# Patient Record
Sex: FEMALE | Race: WHITE | URBAN - METROPOLITAN AREA
[De-identification: names, ages, dates, MRNs, and addresses within clinical notes are randomized per-mention and may not be internally consistent; named-entity substitution may affect disease eponyms.]

---

## 2018-05-10 ENCOUNTER — PRE VISIT (OUTPATIENT)
Dept: ONCOLOGY | Facility: CLINIC | Age: 68
End: 2018-05-10

## 2018-05-10 NOTE — TELEPHONE ENCOUNTER
5/11/18  lung cancer 2nd opinion  pt called  self ref  [facility] records CE, imaging pushed, slides TBM

## 2018-05-11 ENCOUNTER — CARE COORDINATION (OUTPATIENT)
Dept: ONCOLOGY | Facility: CLINIC | Age: 68
End: 2018-05-11

## 2018-05-11 ENCOUNTER — ONCOLOGY VISIT (OUTPATIENT)
Dept: ONCOLOGY | Facility: CLINIC | Age: 68
End: 2018-05-11
Attending: INTERNAL MEDICINE
Payer: COMMERCIAL

## 2018-05-11 VITALS
WEIGHT: 169.1 LBS | TEMPERATURE: 97.4 F | HEART RATE: 72 BPM | SYSTOLIC BLOOD PRESSURE: 153 MMHG | DIASTOLIC BLOOD PRESSURE: 91 MMHG | RESPIRATION RATE: 16 BRPM | HEIGHT: 64 IN | OXYGEN SATURATION: 97 % | BODY MASS INDEX: 28.87 KG/M2

## 2018-05-11 DIAGNOSIS — C88.40 MALT LYMPHOMA: Primary | ICD-10-CM

## 2018-05-11 PROCEDURE — 99205 OFFICE O/P NEW HI 60 MIN: CPT | Mod: ZP | Performed by: INTERNAL MEDICINE

## 2018-05-11 PROCEDURE — G0463 HOSPITAL OUTPT CLINIC VISIT: HCPCS | Mod: ZF

## 2018-05-11 RX ORDER — POTASSIUM CHLORIDE 1500 MG/1
TABLET, EXTENDED RELEASE ORAL
COMMUNITY
Start: 2017-10-06

## 2018-05-11 RX ORDER — LEVOTHYROXINE SODIUM 75 UG/1
TABLET ORAL
COMMUNITY
Start: 2017-12-02

## 2018-05-11 RX ORDER — TRIAMTERENE/HYDROCHLOROTHIAZID 37.5-25 MG
TABLET ORAL
COMMUNITY
Start: 2017-12-04

## 2018-05-11 RX ORDER — NICOTINE POLACRILEX 4 MG/1
20 GUM, CHEWING ORAL
COMMUNITY
Start: 2018-02-07

## 2018-05-11 ASSESSMENT — PAIN SCALES - GENERAL: PAINLEVEL: NO PAIN (0)

## 2018-05-11 NOTE — PROGRESS NOTES
"RN met with pt, Jewell, and Ar, \"mate\"  today.  Introduced self and the care coordinator role as well as how to contact triage vs how to contact RN care coordinator.  Had pt complete authorization to discuss PHI form.  Completed learning assessment with patient.        "

## 2018-05-11 NOTE — LETTER
5/11/2018       RE: Jewell Hutton  [address] [id number]     Dear Colleague,    Thank you for referring your patient, Jewell Hutton, to the [facility]. Please see a copy of my visit note below.    DATE OF VISIT: May 11, 2018    REASON FOR REFERRAL:  MALT lymphoma Lung    HISTORY OF PRESENT ILLNESS:     67-year-old female with past medical history significant for hypothyroidism, hypertension who in May of last year developed symptoms of productive cough. She was treated with courses of antibiotics with improvement in symptoms. Serial chest x-ray did not show the resolution of the right-sided lung mass. She had a CT chest done in June 2017 showed a right infiltrating adenopathy in the right lower nodule measuring 1.8 cm. Patient was referred to pulmonary for further evaluation repeat CT chest was unchanged. She underwent bronchoscopy with brushings and fine-needle aspirate of the lymph nodes which showed granulomatous inflammation. Patient then had a PET scan done on 08/29/17 showed significant right hilar avidity of 14.8. She subsequently underwent EUS on 09/01/17 with biopsy of the hilar lymph nodes but showed granulomatous inflammation consistent with sarcoidosis. Repeat CT scan in January 2018 was unchanged.   01/23/18 underwent repeat bronchoscopy with biopsies which were reviewed at [facility] and results were consistent with MALT lymphoma.     02/2018 seen by medical oncology in [place]. She was treated with weekly Decadron once a week for 6 weeks followed by a repeat scan in April 2018 which was stable. Plan is to continue observation with repeat scans    Presents to the clinic today for second opinion. Clinically feels well with no active complaints. Denies dyspnea, chest pain, productive cough, fever/chills, night sweats, weight loss, generalized fatigue or any other complaints. Good appetite and energy levels      REVIEW OF SYSTEMS:      ROS: 14 point ROS neg other  than the symptoms noted above in the HPI.    PAST MEDICAL HISTORY:   Hypertension  Hypothyroidism  GERD  GARCIA    PAST SURGICAL HISTORY:   Lung biopsy    ALLERGIES:   Allergies as of 05/11/2018     (Not on File)       MEDICATIONS:   Reviewed    FAMILY HISTORY:   Sister with breast cancer at age 55  Breast cancer at age 70  1 sibling with esophageal cancer    SOCIAL HISTORY:   Social History     Social History     Marital status: Single     Spouse name: N/A     Number of children: N/A     Years of education: N/A     Social History Main Topics     Smoking status: Not on file     Smokeless tobacco: Not on file     Alcohol use Not on file     Drug use: Not on file     Sexual activity: Not on file     Other Topics Concern     Not on file     Social History Narrative       PHYSICAL EXAMINATION:   There were no vitals taken for this visit.  Wt Readings from Last 10 Encounters:   No data found for Wt      ECOG performance status: 0  Exam:  Constitutional: healthy, alert and no distress  Head: Normocephalic. No masses, lesions, tenderness or abnormalities  Neck: Neck supple. No adenopathy.  ENT: ENT exam normal, no neck nodes or sinus tenderness  Cardiovascular: RRR. No murmurs, clicks gallops or rub  Respiratory:  Lungs clear  Gastrointestinal: Abdomen soft, non-tender. BS normal. No masses, organomegaly  Musculoskeletal: extremities normal  Skin: no suspicious lesions or rashes  Neurologic: Gait normal. Sensation grossly WNL.  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  See comment.      MCSS:  I      Comments This case was forwarded to [facility], Department of  "Pathology, [place], [place] for expert pulmonary pathology consultation (case #CA-). The above diagnosis reflects their expert consultative opinion. In addition, the patient's prior right subcarinal needle aspirate ([id number]) was reviewed in conjunction with this case. Their rendered diagnosis on that case was \"Mixture of blood clot, abundant small mature-appearing lymphocytes, and scattered small clusters of epithelioid histiocytes, consistent with small noncaseating granulomas.\" Diagnosis reported to Dr. Anant Paige.      Gross Description Received is one container, specimen in formalin,  labeled with proper patient identification.    Designated \"RB1 and RB6 transbronchial biopsy\" are 7 pale tan friable tissue fragments, less than 0.1-0.6 cm, entirely submitted in A1.  [facility]     Microscopic The H+E stained sections of the right upper lobe and right lower lobe transbronchial biopsies reveal fragments of lung tissue with a relatively dense and nodular infiltrate of small, cytologically mature lymphocytes and scattered plasma cells.  In addition, focal small histiocytic clusters consistent with small noncaseating granulomas are noted.  The case was forwarded to [facility], [place], [place] for pulmonary pathology consultation.  Dr. Real Hart reports that immunostains for CD3 highlight scattered small background lymphocytes, however, most of the lymphoid infiltrate exhibits positivity for CD20.  CD20 positive cells are also reported to demonstrate coexpression of CD43 and BCL-2 without expression of CD5, CD10, BCL-6 or cyclin D1.  CD21 is reported to demonstrate scattered follicular dendritic networks.  Kappa and lambda light chain in situ hybridization is reported to reveal a kappa light chain restriction within the plasma cell population.  The proliferation rate was estimated at 5-10% per Ki-67 staining.  MARÍA ELENA/dmk        09/01/17   Non-Gynecologic Cytology Interpretation Right " subcarinal lymph node, EUS-guided FNA cytology:  Negative for malignant cells.  Lymph node material obtained.  Noncaseating granulomatous inflammation suggestive of sarcoidosis.  See comment.       08/18/17   Non-Gynecologic Cytology Interpretation Subcarinal lymph node, EBUS-guided FNA cytology:  Negative for malignant cells.  Includes some histiocytic clusters, without caseous necrosis or significant acute inflammatory component.  See comment.       IMAGING RESULTS:    CT chest 04/03/18     IMPRESSION:  1.Consolidative changes of the right lung. Findings are similar to the prior study. Stable associated central adenopathy.  2.Stable right lung nodule.  3.No new mass or adenopathy    CT chest 01/12/18       IMPRESSION:   1. Consolidative changes involving the right upper lobe and perihilar and infrahilar region, unchanged.  2. A 1.5 cm subpleural right lower lobe nodule and a 5 mm left lower lobe nodule, unchanged.    CT chest 10/25/17   IMPRESSION: Consolidation involving the right hilar region with peribronchial thickening and air bronchograms extending to the right upper lung and along the medial aspect of the right middle lobe and the right lower lobe. This has not changed from   08/17/2017. Recommend tissue sampling. On the PET scan, there were nodular areas of increased uptake noted along the periphery.    PET scan 08/25/17     IMPRESSION:  1. FDG avid perihilar consolidation with some areas appearing more solid. Findings concerning for central neoplastic process. Soft tissue sampling is warranted to evaluate.  2. Low-level in 1.8 cm right lower lobe pleural-based nodule with some calcifications. This probably represents a granulomatous process or hamartoma but with the low activity, it should be followed closely.   3. Hepatic steatosis.    CT chest 06/2017     FINDINGS: There is no mediastinal or hilar lymphadenopathy. There is alveolar infiltrate in a portion of the right upper lobe in the right suprahilar  region, with no definite obstructing mass.    There is a 1.8 cm nodular density in the right costophrenic angle in the right lower lobe inferiorly and laterally, indeterminate. Though this could represent focal atelectasis, this is indeterminate. In the left lower lobe posteriorly (series 2 image   45) there is a 7 mm uncalcified nodule, indeterminate. Followup of the right upper lobe infiltrate with plain films in 6 weeks recommended and followup of the two pulmonary nodules in 3 months recommended to assess stability.    There is diffuse fatty infiltration of the liver. 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   We will have the slides reviewed by our pathology Department to  confirm diagnosis.  We had a detailed discussion about the prognosis associated with low-grade indolent MALT lymphoma and potential treatment options. Given stable imaging findings and absence of symptoms, very reasonable to continue with observation at this point with consideration for radiation or Rituxan if there is evidence of recurrence in future.    Patient understands and agrees with above discussion with no further questions at this point. She will continue to follow up with local pulmonologist and medical oncologist with surveillance scans as planned.     RTCas needed.     The patient is ready to learn, no apparent learning barriers were identified, Diagnosis and treatment plans were explained to the patient. The patient expressed understanding of the content. The patient questions were answered to her satisfaction.    Chart documentation with Dragon Voice recognition Software. Although reviewed after completion, some  words and grammatical errors may remain.    Miko Saab MD  Attending Physician   Hematology/Medical Oncology

## 2018-05-11 NOTE — NURSING NOTE
"Oncology Rooming Note    May 11, 2018 3:35 PM   Jewell Hutton is a 67 year old female who presents for:    Chief Complaint   Patient presents with     Oncology Clinic Visit     Lung Cancer     Initial Vitals: BP (!) 153/91  Pulse 72  Temp 97.4  F (36.3  C) (Oral)  Resp 16  Ht 1.626 m (5' 4\")  Wt 76.7 kg (169 lb 1.6 oz)  SpO2 97%  BMI 29.03 kg/m2 Estimated body mass index is 29.03 kg/(m^2) as calculated from the following:    Height as of this encounter: 1.626 m (5' 4\").    Weight as of this encounter: 76.7 kg (169 lb 1.6 oz). Body surface area is 1.86 meters squared.  No Pain (0) Comment: Data Unavailable   No LMP recorded.  Allergies reviewed: Yes  Medications reviewed: Yes    Medications: Medication refills not needed today.  Pharmacy name entered into EPIC: Data Unavailable    Clinical concerns: No New Concerns    5 minutes for nursing intake (face to face time)     BABAR Vizcaino      "

## 2018-05-11 NOTE — PROGRESS NOTES
DATE OF VISIT: May 11, 2018    REASON FOR REFERRAL:  MALT lymphoma Lung    HISTORY OF PRESENT ILLNESS:     67-year-old female with past medical history significant for hypothyroidism, hypertension who in May of last year developed symptoms of productive cough. She was treated with courses of antibiotics with improvement in symptoms. Serial chest x-ray did not show the resolution of the right-sided lung mass. She had a CT chest done in June 2017 showed a right infiltrating adenopathy in the right lower nodule measuring 1.8 cm. Patient was referred to pulmonary for further evaluation repeat CT chest was unchanged. She underwent bronchoscopy with brushings and fine-needle aspirate of the lymph nodes which showed granulomatous inflammation. Patient then had a PET scan done on 08/29/17 showed significant right hilar avidity of 14.8. She subsequently underwent EUS on 09/01/17 with biopsy of the hilar lymph nodes but showed granulomatous inflammation consistent with sarcoidosis. Repeat CT scan in January 2018 was unchanged.   01/23/18 underwent repeat bronchoscopy with biopsies which were reviewed at [facility] and results were consistent with MALT lymphoma.     02/2018 seen by medical oncology in [place]. She was treated with weekly Decadron once a week for 6 weeks followed by a repeat scan in April 2018 which was stable. Plan is to continue observation with repeat scans    Presents to the clinic today for second opinion. Clinically feels well with no active complaints. Denies dyspnea, chest pain, productive cough, fever/chills, night sweats, weight loss, generalized fatigue or any other complaints. Good appetite and energy levels      REVIEW OF SYSTEMS:      ROS: 14 point ROS neg other than the symptoms noted above in the HPI.    PAST MEDICAL HISTORY:   Hypertension  Hypothyroidism  GERD  GARCIA    PAST SURGICAL HISTORY:   Lung biopsy    ALLERGIES:   Allergies as of 05/11/2018     (Not on File)       MEDICATIONS:    Reviewed    FAMILY HISTORY:   Sister with breast cancer at age 55  Breast cancer at age 70  1 sibling with esophageal cancer    SOCIAL HISTORY:   Social History     Social History     Marital status: Single     Spouse name: N/A     Number of children: N/A     Years of education: N/A     Social History Main Topics     Smoking status: Not on file     Smokeless tobacco: Not on file     Alcohol use Not on file     Drug use: Not on file     Sexual activity: Not on file     Other Topics Concern     Not on file     Social History Narrative       PHYSICAL EXAMINATION:   There were no vitals taken for this visit.  Wt Readings from Last 10 Encounters:   No data found for Wt      ECOG performance status: 0  Exam:  Constitutional: healthy, alert and no distress  Head: Normocephalic. No masses, lesions, tenderness or abnormalities  Neck: Neck supple. No adenopathy.  ENT: ENT exam normal, no neck nodes or sinus tenderness  Cardiovascular: RRR. No murmurs, clicks gallops or rub  Respiratory:  Lungs clear  Gastrointestinal: Abdomen soft, non-tender. BS normal. No masses, organomegaly  Musculoskeletal: extremities normal  Skin: no suspicious lesions or rashes  Neurologic: Gait normal. Sensation grossly WNL.  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  See comment.      MCSS:  I      Comments This case was forwarded to [facility], Department of Pathology, [place], [place] for expert pulmonary pathology consultation (case #CA-). The above diagnosis reflects their expert consultative opinion. In addition, the patient's prior right subcarinal needle aspirate  "([id number]) was reviewed in conjunction with this case. Their rendered diagnosis on that case was \"Mixture of blood clot, abundant small mature-appearing lymphocytes, and scattered small clusters of epithelioid histiocytes, consistent with small noncaseating granulomas.\" Diagnosis reported to Dr. Anant Paige.      Gross Description Received is one container, specimen in formalin,  labeled with proper patient identification.    Designated \"RB1 and RB6 transbronchial biopsy\" are 7 pale tan friable tissue fragments, less than 0.1-0.6 cm, entirely submitted in A1.  [facility]     Microscopic The H+E stained sections of the right upper lobe and right lower lobe transbronchial biopsies reveal fragments of lung tissue with a relatively dense and nodular infiltrate of small, cytologically mature lymphocytes and scattered plasma cells.  In addition, focal small histiocytic clusters consistent with small noncaseating granulomas are noted.  The case was forwarded to [facility], [place], [place] for pulmonary pathology consultation.  Dr. Real Hart reports that immunostains for CD3 highlight scattered small background lymphocytes, however, most of the lymphoid infiltrate exhibits positivity for CD20.  CD20 positive cells are also reported to demonstrate coexpression of CD43 and BCL-2 without expression of CD5, CD10, BCL-6 or cyclin D1.  CD21 is reported to demonstrate scattered follicular dendritic networks.  Kappa and lambda light chain in situ hybridization is reported to reveal a kappa light chain restriction within the plasma cell population.  The proliferation rate was estimated at 5-10% per Ki-67 staining.  MARÍA ELENA/dmk        09/01/17   Non-Gynecologic Cytology Interpretation Right subcarinal lymph node, EUS-guided FNA cytology:  Negative for malignant cells.  Lymph node material obtained.  Noncaseating granulomatous inflammation suggestive of sarcoidosis.  See comment.       08/18/17   Non-Gynecologic " Cytology Interpretation Subcarinal lymph node, EBUS-guided FNA cytology:  Negative for malignant cells.  Includes some histiocytic clusters, without caseous necrosis or significant acute inflammatory component.  See comment.       IMAGING RESULTS:    CT chest 04/03/18     IMPRESSION:  1.Consolidative changes of the right lung. Findings are similar to the prior study. Stable associated central adenopathy.  2.Stable right lung nodule.  3.No new mass or adenopathy    CT chest 01/12/18       IMPRESSION:   1. Consolidative changes involving the right upper lobe and perihilar and infrahilar region, unchanged.  2. A 1.5 cm subpleural right lower lobe nodule and a 5 mm left lower lobe nodule, unchanged.    CT chest 10/25/17   IMPRESSION: Consolidation involving the right hilar region with peribronchial thickening and air bronchograms extending to the right upper lung and along the medial aspect of the right middle lobe and the right lower lobe. This has not changed from   08/17/2017. Recommend tissue sampling. On the PET scan, there were nodular areas of increased uptake noted along the periphery.    PET scan 08/25/17     IMPRESSION:  1. FDG avid perihilar consolidation with some areas appearing more solid. Findings concerning for central neoplastic process. Soft tissue sampling is warranted to evaluate.  2. Low-level in 1.8 cm right lower lobe pleural-based nodule with some calcifications. This probably represents a granulomatous process or hamartoma but with the low activity, it should be followed closely.   3. Hepatic steatosis.    CT chest 06/2017     FINDINGS: There is no mediastinal or hilar lymphadenopathy. There is alveolar infiltrate in a portion of the right upper lobe in the right suprahilar region, with no definite obstructing mass.    There is a 1.8 cm nodular density in the right costophrenic angle in the right lower lobe inferiorly and laterally, indeterminate. Though this could represent focal atelectasis,  this is indeterminate. In the left lower lobe posteriorly (series 2 image   45) there is a 7 mm uncalcified nodule, indeterminate. Followup of the right upper lobe infiltrate with plain films in 6 weeks recommended and followup of the two pulmonary nodules in 3 months recommended to assess stability.    There is diffuse fatty infiltration of the liver. 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   We will have the slides reviewed by our pathology Department to  confirm diagnosis.  We had a detailed discussion about the prognosis associated with low-grade indolent MALT lymphoma and potential treatment options. Given stable imaging findings and absence of symptoms, very reasonable to continue with observation at this point with consideration for radiation or Rituxan if there is evidence of recurrence in future.    Patient understands and agrees with above discussion with no further questions at this point. She will continue to follow up with local pulmonologist and medical oncologist with surveillance scans as planned.     RTCas needed.     The patient is ready to learn, no apparent learning barriers were identified, Diagnosis and treatment plans were explained to the patient. The patient expressed understanding of the content. The patient questions were answered to her satisfaction.    Chart documentation with Dragon Voice recognition Software. Although reviewed after completion, some words and grammatical errors may remain.    Miko Saab MD  Attending Physician   Hematology/Medical Oncology

## 2018-05-11 NOTE — MR AVS SNAPSHOT
"              After Visit Summary   2018    Jewell Hutton    MRN: [id number]           Patient Information     Date Of Birth          1950        Visit Information        Provider Department      2018 3:30 PM Miko Saab MD [facility]        Today's Diagnoses     MALT lymphoma (H)    -  1       Follow-ups after your visit        Who to contact     If you have questions or need follow up information about today's clinic visit or your schedule please contact [facility] directly at [phone].  Normal or non-critical lab and imaging results will be communicated to you by Erydelhart, letter or phone within 4 business days after the clinic has received the results. If you do not hear from us within 7 days, please contact the clinic through Erydelhart or phone. If you have a critical or abnormal lab result, we will notify you by phone as soon as possible.  Submit refill requests through Minekey or call your pharmacy and they will forward the refill request to us. Please allow 3 business days for your refill to be completed.          Additional Information About Your Visit        MyChart Information     Minekey lets you send messages to your doctor, view your test results, renew your prescriptions, schedule appointments and more. To sign up, go to [email or web address] the [email or web address] . Click on \"Log in\" on the left side of the screen, which will take you to the Welcome page. Then click on \"Sign up Now\" on the right side of the page.     You will be asked to enter the access code listed below, as well as some personal information. Please follow the directions to create your username and password.     Your access code is: [email or web address]  Expires: 2018  6:30 AM     Your access code will  in 90 days. If you need help or a new code, please call your [facility] or [phone].        Care EveryWhere ID     This is your Care EveryWhere ID. This could be used by other " "organizations to access your [facility] medical records  [id number]        Your Vitals Were     Pulse Temperature Respirations Height Pulse Oximetry BMI (Body Mass Index)    72 97.4  F (36.3  C) (Oral) 16 1.626 m (5' 4\") 97% 29.03 kg/m2       Blood Pressure from Last 3 Encounters:   No data found for BP    Weight from Last 3 Encounters:   No data found for Wt              Today, you had the following     No orders found for display       Primary Care Provider Office Phone # Fax #    Claude Roy [phone]       [facility] [address]   [place] [address]        Equal Access to Services     [facility] Center[facility] : Hadii luz Michael, wajuany mendez, qajorge kaalmada amparo, antonia godwin . So [facility] [phone].    ATENCIÓN: Si habla español, tiene a morton disposición servicios gratuitos de asistencia lingüística. Llame al [phone].    We comply with applicable federal civil rights laws and [place] laws. We do not discriminate on the basis of race, color, national origin, age, disability, sex, sexual orientation, or gender identity.            Thank you!     Thank you for choosing [facility]  for your care. Our goal is always to provide you with excellent care. Hearing back from our patients is one way we can continue to improve our services. Please take a few minutes to complete the written survey that you may receive in the mail after your visit with us. Thank you!             Your Updated Medication List - Protect others around you: Learn how to safely use, store and throw away your medicines at www.disposemymeds.org.          This list is accurate as of 5/11/18 11:59 PM.  Always use your most recent med list.                   Brand Name Dispense Instructions for use Diagnosis    levothyroxine 75 MCG tablet    SYNTHROID/LEVOTHROID     TAKE 1 TABLET EVERY DAY        omeprazole 20 MG tablet      Take 20 mg by mouth        " potassium chloride SA 20 MEQ CR tablet    K-DUR/KLOR-CON M     TAKE 1 TABLET EVERY DAY        triamterene-hydrochlorothiazide 37.5-25 MG per tablet    MAXZIDE-25     TAKE 1 TABLET EVERY DAY

## 2018-05-16 PROCEDURE — 00000346 ZZHCL STATISTIC REVIEW OUTSIDE SLIDES TC 88321: Performed by: INTERNAL MEDICINE

## 2018-05-23 LAB — COPATH REPORT: NORMAL

## 2018-06-05 ENCOUNTER — CARE COORDINATION (OUTPATIENT)
Dept: ONCOLOGY | Facility: CLINIC | Age: 68
End: 2018-06-05

## 2018-06-05 NOTE — PROGRESS NOTES
Left message for pt apologizing for not calling back yesterday as had hoped.  Stated have learned, [facility] of 5/31, [facility] in [place].  Gina in process of obtaining slides and we have request for them to forward slides to us for path review as soon as they receive them.  Stated working with colleague who has been very persistent in working on situation.  Encouraged her to call if any questions or concerned.  Also stated if not heard anything back from author next week is welcome to call for update.

## 2018-06-18 ENCOUNTER — CARE COORDINATION (OUTPATIENT)
Dept: ONCOLOGY | Facility: CLINIC | Age: 68
End: 2018-06-18

## 2018-06-18 LAB — COPATH REPORT: NORMAL

## 2018-06-18 NOTE — PROGRESS NOTES
"Left message for Tj reviewing pt path report with order date of 5/17/18 had in comment section statement regarding need to obtain and review slides sent to [facility].  Also stated received message last week from new patient  about message she received from [facility] stating slides sent to \"Avelina\" in our pathology dept.  Asked is she is aware of situation as not seeing any addendum on either path report.    Per Special Leonel Spencer, they did receive slides in question about 2 weeks ago and gave them to pathologist who originally signed out case.  She will check with him about status.    "

## 2018-06-19 ENCOUNTER — CARE COORDINATION (OUTPATIENT)
Dept: ONCOLOGY | Facility: CLINIC | Age: 68
End: 2018-06-19

## 2018-06-19 NOTE — PROGRESS NOTES
Received message from pt today stating someone was to call her back about pathology report and she has not heard back yet.  Pathology report dated as ordered on 5/17 was addended yesterday.  Late entry on 7/10/18:  RN updated Dr. Saab on status of report and pt calling about results.  He communicated that he would contact pt.

## 2018-07-02 ENCOUNTER — CARE COORDINATION (OUTPATIENT)
Dept: ONCOLOGY | Facility: CLINIC | Age: 68
End: 2018-07-02

## 2018-07-02 NOTE — PROGRESS NOTES
Reviewed Dr. Saab's plan after visit with pt in May.  Also stated Dr. Saab had told RN that since dx did not change, he thought local oncologist's plan was good plan for her situation.  When she asked about night sweats, RN instructed her to call local oncologist about recent start of night sweats given she has not had menopausal night sweats for a long time.  Pt stated she would call.